# Patient Record
Sex: FEMALE | Employment: FULL TIME | URBAN - METROPOLITAN AREA
[De-identification: names, ages, dates, MRNs, and addresses within clinical notes are randomized per-mention and may not be internally consistent; named-entity substitution may affect disease eponyms.]

---

## 2020-05-22 ENCOUNTER — HOSPITAL ENCOUNTER (EMERGENCY)
Facility: CLINIC | Age: 30
Discharge: HOME | End: 2020-05-22
Attending: EMERGENCY MEDICINE | Admitting: EMERGENCY MEDICINE
Payer: COMMERCIAL

## 2020-05-22 VITALS
DIASTOLIC BLOOD PRESSURE: 72 MMHG | RESPIRATION RATE: 18 BRPM | HEART RATE: 78 BPM | OXYGEN SATURATION: 100 % | TEMPERATURE: 98 F | SYSTOLIC BLOOD PRESSURE: 124 MMHG

## 2020-05-22 DIAGNOSIS — H11.421 CHEMOSIS OF CONJUNCTIVA SUBCONJUNCTIVAL EDEMA, RIGHT: ICD-10-CM

## 2020-05-22 DIAGNOSIS — H10.13 ALLERGIC CONJUNCTIVITIS, BILATERAL: ICD-10-CM

## 2020-05-22 PROCEDURE — 25000132 ZZH RX MED GY IP 250 OP 250 PS 637: Performed by: EMERGENCY MEDICINE

## 2020-05-22 PROCEDURE — 99283 EMERGENCY DEPT VISIT LOW MDM: CPT

## 2020-05-22 RX ORDER — DIPHENHYDRAMINE HCL 25 MG
25 CAPSULE ORAL ONCE
Status: COMPLETED | OUTPATIENT
Start: 2020-05-22 | End: 2020-05-22

## 2020-05-22 RX ORDER — DIPHENHYDRAMINE HCL 25 MG
25-50 TABLET ORAL EVERY 6 HOURS PRN
Qty: 20 TABLET | Refills: 0 | Status: SHIPPED | OUTPATIENT
Start: 2020-05-22

## 2020-05-22 RX ADMIN — DIPHENHYDRAMINE HYDROCHLORIDE 25 MG: 25 CAPSULE ORAL at 08:35

## 2020-05-22 ASSESSMENT — ENCOUNTER SYMPTOMS
EYE DISCHARGE: 1
EYE ITCHING: 1
FACIAL SWELLING: 1

## 2020-05-22 NOTE — ED PROVIDER NOTES
History     Chief Complaint:  Facial Swelling    The history is provided by the patient.      Reji Lockett is a 30 year old female with a history of seasonal allergies who is inconsistently taking Allegra who presents with facial swelling. The patient reports last night she noticed some right eye swelling, watering and itchiness. She states that she spent quite a bit of time rubbing her eye yesterday. She also notes that she went on 2 walks yesterday. The patient states that when she woke up today she had increased swelling. The patient denies any history of eye swelling. The patient denies any pain or vision changes. She notes that she has a wedding in 10 days and is concerned about the swelling being present at the wedding.     Allergies:  No Known Drug Allergies     Medications:    The patient is not currently taking any prescribed medications.     Past Medical History:    History reviewed. No pertinent past medical history.     Past Surgical History:    History reviewed. No pertinent past surgical history.     Family History:    History reviewed. No pertinent family history.       Social History:  Smoking Status: Never Smoker  Smokeless Tobacco: Never Used  Alcohol Use: Positive     Review of Systems   HENT: Positive for facial swelling.    Eyes: Positive for discharge and itching. Negative for visual disturbance.   All other systems reviewed and are negative.      Physical Exam     Patient Vitals for the past 24 hrs:   BP Temp Temp src Heart Rate Resp SpO2   05/22/20 0752 (!) 135/97 98  F (36.7  C) Temporal 79 18 98 %       Physical Exam  Nursing note and vitals reviewed.  General: Patient is alert and interactive when I enter the room  Head:  The scalp, face, and head appear normal  Eyes:  Periorbital edema R>L    Injected conjunctiva bilaterally with chemosis on the right.     No erythema/cellulitis.     No proptosis.    EOMI  ENT:    The nose is normal    Pinnae are normal  Neck:  Trachea  midline  CV:  Normal rate  Resp:  No respiratory distress   Musc:  Normal muscular tone    No major joint effusions    No asymmetric leg swelling    Good capillary refill noted  Skin:  No rash or lesions noted  Neuro: Speech is normal and fluent. Face is symmetric. Moving all extremities well.   Psych:  Awake. Alert.  Normal affect.  Appropriate interactions.            Emergency Department Course     Interventions:  0835 Benadryl 25 mg oral     Emergency Department Course:     Nursing notes and vitals reviewed.    0800 I performed an exam of the patient as documented above.     0835 I answered all questions prior to discharge.     Impression & Plan      Medical Decision Making:  Reji Lockett is a 30 year old female who presents to the emergency department today for evaluation of right eye swelling.  She does not have any eye pain or vision changes.  Her presentation is consistent with allergic conjunctivitis, though she also has some chemosis of the right eye.  Symptoms are bilateral and associated with itching.  There is no pain or skin redness to suspect corneal abrasion or orbital/periorbital cellulitis.  Doubt infectious conjunctivitis given classic presentation of itching, watery eyes, and patient does report a history of seasonal allergies and takes antihistamine on a near daily basis.  She has a wedding in 10 days, therefore I recommended that she start taking Benadryl, as well as Visine eyedrops until after the wedding to prevent any rebound hyperemia on her wedding day.  Also discussed that she can see ophthalmology on Monday/Tuesday if symptoms are not improving over the weekend.  Return to emergency department for any eye pain, changes in vision, or for any other concerns.  She is discharged in stable condition.  All questions answered and she is in agreement with the plan.    Diagnosis:    ICD-10-CM    1. Allergic conjunctivitis, bilateral  H10.13    2. Chemosis of conjunctiva subconjunctival edema,  right  H11.421      Disposition:   Findings and plan explained to the Patient. Patient discharged home with instructions regarding supportive care, medications, and reasons to return. The importance of close follow-up was reviewed. The patient was prescribed benadryl and naphcon-A.     Discharge Medications:  START taking      Dose / Directions   diphenhydrAMINE 25 MG tablet  Commonly known as:  BENADRYL      Dose:  25-50 mg  Take 1-2 tablets (25-50 mg) by mouth every 6 hours as needed for itching or allergies  Quantity:  20 tablet  Refills:  0     naphazoline-pheniramine 0.025-0.3 % ophthalmic solution  Commonly known as:  NAPHCON-A      Dose:  1-2 drop  Place 1-2 drops into both eyes 4 times daily as needed for allergies or dry eyes  Quantity:  5 mL  Refills:  0       Scribe Disclosure:  Monique LEDESMA, am serving as a scribe at 7:59 AM on 5/22/2020 to document services personally performed by Goyo Weir MD based on my observations and the provider's statements to me.    [facility] EMERGENCY DEPARTMENT       Goyo Weir MD  05/22/20 0858

## 2020-05-22 NOTE — DISCHARGE INSTRUCTIONS
Diagnosis: Allergic conjunctivitis with chemosis  Plan: Start benadryl every 6 hours for rest of today into tomorrow.  Start visine eye drops - use these for a maximum of 2 weeks.   When you stop using these, it's common for your eyes to be a little more red the first 2-3 days after you've stopped. Since your wedding is in the next 2 weeks, it might make more sense to stop after your wedding day.  Follow-up with ophthalmology early next week if your symptoms haven't improved over the weekend.   Return Precautions:     Please read the remainder of your discharge instructions for more information.

## 2020-05-22 NOTE — ED AVS SNAPSHOT
[facility] Emergency Department  [address]  [place] [address]  Phone:  [phone]  Fax:  [phone]                                    Reji Lockett   MRN: [id number]    Department:  [facility] Emergency Department   Date of Visit:  5/22/2020           After Visit Summary Signature Page    I have received my discharge instructions, and my questions have been answered. I have discussed any challenges I see with this plan with the nurse or doctor.    ..........................................................................................................................................  Patient/Patient Representative Signature      ..........................................................................................................................................  Patient Representative Print Name and Relationship to Patient    ..................................................               ................................................  Date                                   Time    ..........................................................................................................................................  Reviewed by Signature/Title    ...................................................              ..............................................  Date                                               Time          22EPIC Rev 08/18

## 2020-06-01 ENCOUNTER — HOSPITAL ENCOUNTER (EMERGENCY)
Facility: CLINIC | Age: 30
Discharge: HOME | End: 2020-06-02
Attending: EMERGENCY MEDICINE | Admitting: EMERGENCY MEDICINE
Payer: COMMERCIAL

## 2020-06-01 VITALS
SYSTOLIC BLOOD PRESSURE: 126 MMHG | RESPIRATION RATE: 20 BRPM | TEMPERATURE: 98.3 F | OXYGEN SATURATION: 100 % | HEART RATE: 89 BPM | DIASTOLIC BLOOD PRESSURE: 87 MMHG

## 2020-06-01 DIAGNOSIS — R21 RASH DUE TO ALLERGY: ICD-10-CM

## 2020-06-01 DIAGNOSIS — T78.40XA RASH DUE TO ALLERGY: ICD-10-CM

## 2020-06-01 PROCEDURE — 99283 EMERGENCY DEPT VISIT LOW MDM: CPT

## 2020-06-01 NOTE — ED AVS SNAPSHOT
[facility] Emergency Department  [address]  [facility] [address]  Phone:  [phone]  Fax:  [phone]                                    Reji Lockett   MRN: [id number]    Department:  [facility] Emergency Department   Date of Visit:  6/1/2020           After Visit Summary Signature Page    I have received my discharge instructions, and my questions have been answered. I have discussed any challenges I see with this plan with the nurse or doctor.    ..........................................................................................................................................  Patient/Patient Representative Signature      ..........................................................................................................................................  Patient Representative Print Name and Relationship to Patient    ..................................................               ................................................  Date                                   Time    ..........................................................................................................................................  Reviewed by Signature/Title    ...................................................              ..............................................  Date                                               Time          22EPIC Rev 08/18

## 2020-06-02 PROCEDURE — 25000131 ZZH RX MED GY IP 250 OP 636 PS 637: Performed by: EMERGENCY MEDICINE

## 2020-06-02 RX ORDER — PREDNISONE 20 MG/1
40 TABLET ORAL ONCE
Status: COMPLETED | OUTPATIENT
Start: 2020-06-02 | End: 2020-06-02

## 2020-06-02 RX ORDER — TRIAMCINOLONE ACETONIDE 1 MG/G
CREAM TOPICAL
Qty: 45 G | Refills: 0 | Status: SHIPPED | OUTPATIENT
Start: 2020-06-02 | End: 2020-06-16

## 2020-06-02 RX ORDER — PREDNISONE 20 MG/1
40 TABLET ORAL DAILY
Qty: 8 TABLET | Refills: 0 | Status: SHIPPED | OUTPATIENT
Start: 2020-06-02

## 2020-06-02 RX ORDER — PREDNISONE 20 MG/1
40 TABLET ORAL DAILY
Qty: 8 TABLET | Refills: 0 | Status: SHIPPED | OUTPATIENT
Start: 2020-06-02 | End: 2020-06-02

## 2020-06-02 RX ADMIN — PREDNISONE 40 MG: 20 TABLET ORAL at 01:00

## 2020-06-02 ASSESSMENT — ENCOUNTER SYMPTOMS: FEVER: 0

## 2020-06-02 NOTE — ED PROVIDER NOTES
History     Chief Complaint:  Derm Problem      HPI   Reji Lockett is a 30 year old female who presents for the evaluation of derm problem. The patient reports that last night she started to develop blisters and an itchiness sensation across her hands, forearms, and lower legs where she had lencho tattooing done that has remained persistent, prompting her to the ED. The patient describes that the blisters will burn with water washing. She states that she had lencho tattoos done in 2018 with a similar reaction, but notes that it was much less severe. She notes that she also is experiencing increased sneezing and rhinorrhea and that she took a dose of Allegra today. The patient denies fever and other issues.      Allergies:  No known drug allergies      Medications:    The patient is not currently taking any prescribed medications.     Past Medical History:    Asthma  Irregular menstrual cycle  Obesity  Constipation  Malaise and fatigue   Elevated hemoglobin A1c    Past Surgical History:    History reviewed. No pertinent surgical history.     Family History:    History reviewed. No pertinent family history.      Social History:  Smoking status: Never smoker  Alcohol use: Yes  Drug use: No  PCP: No Ref-Primary, Physician   Marital Status:  Single [1]     Review of Systems   Constitutional: Negative for fever.   Skin: Positive for rash.   All other systems reviewed and are negative.        Physical Exam     Patient Vitals for the past 24 hrs:   BP Temp Temp src Pulse Heart Rate Resp SpO2   06/01/20 2302 126/87 98.3  F (36.8  C) Oral 89 89 20 100 %      Physical Exam  I have reviewed the triage vital signs    Constitutional: Appears stated age  Eyes: No discharge, symmetrical lids  ENT: Moist mucous membranes, no ear discharge. No angioedema, voice normal  Neck: Full range of motion  Respiratory: CTAB, no wheezes  Cardiovascular: Regular rate and rhythm, no cyanosis  Gastrointestinal: Nondistended,  nonscaphoid  Musculoskeletal: No gross deformities.   Skin: Warm and well perfused. Elham tattoos to bilateral forearms and lower legs.  Raised papular rash to elham surfaces with multiple vesicles to punctate regions of elham tattoo, nondraining, nontender, pruritic  Neurologic: Moves all extremities, speech fluent without dysarthria  Psychiatric: Appropriate affect, alert and interactive    Emergency Department Course   Interventions:  Medications   predniSONE (DELTASONE) tablet 40 mg (has no administration in time range)         Emergency Department Course:  Past medical records, nursing notes, and vitals reviewed.  0033: I performed an exam of the patient and obtained history, as documented above.     Findings and plan explained to the Patient. Patient discharged home with instructions regarding supportive care, medications, and reasons to return. The importance of close follow-up was reviewed. The patient was prescribed prednisone and triamcinolone.       Impression & Plan    Medical Decision MakinF presenting with rash.  DDx includes but is not limited to allergic rash, anaphylactoid reaction, doubt anaphylaxis, doubt cellulitis, doubt abscess  Suspect allergic reaction, given prior allergy to elham and appearance of rash and pruritic nature.  Does not appear acutely infected.  Pt otherwise very well appearing.  No e/o anaphylaxis, single system involvement.  Will treat with steroids as above.  Advised follow-up with PCP for referral to allergist.  RTED precautions given.    Diagnosis:    ICD-10-CM    1. Rash due to allergy  R21        Disposition:  Discharged to home.    Discharge Medications:  New Prescriptions    PREDNISONE (DELTASONE) 20 MG TABLET    Take 2 tablets (40 mg) by mouth daily for 4 days    TRIAMCINOLONE (KENALOG) 0.1 % EXTERNAL CREAM    80 grams     Scribe Disclosure:  Suresh LEDESMA, marisol serving as a scribe at 12:08 AM on 2020 to document services personally performed by Scott  Leo Alfonso MD based on my observations and the provider's statements to me.      Suresh Luciano  6/1/2020   [facility] EMERGENCY DEPARTMENT       Vo, Leo Alfonso MD  06/02/20 0058

## 2020-06-02 NOTE — ED TRIAGE NOTES
Pt arrives with complaints of blistering and itchiness after getting lencho tattoos on bilateral arms, legs, hands, feet. Pt noticed these starting last night, says she took benadryl last night. Had a reaction to lencho in the past. ABCs intact, A/O x4.

## 2022-11-18 RX ORDER — FEXOFENADINE HCL 180 MG/1
180 TABLET ORAL DAILY
COMMUNITY

## 2022-11-18 RX ORDER — MULTIVIT-MIN/IRON/FOLIC ACID/K 18-600-40
CAPSULE ORAL
COMMUNITY

## 2022-11-18 RX ORDER — ORAL SEMAGLUTIDE 3 MG/1
TABLET ORAL
COMMUNITY

## 2022-11-22 ENCOUNTER — ANESTHESIA EVENT (OUTPATIENT)
Dept: SURGERY | Facility: AMBULATORY SURGERY CENTER | Age: 32
End: 2022-11-22
Payer: COMMERCIAL

## 2022-11-23 ENCOUNTER — ANESTHESIA (OUTPATIENT)
Dept: SURGERY | Facility: AMBULATORY SURGERY CENTER | Age: 32
End: 2022-11-23
Payer: COMMERCIAL

## 2022-11-23 ENCOUNTER — HOSPITAL ENCOUNTER (OUTPATIENT)
Facility: AMBULATORY SURGERY CENTER | Age: 32
Discharge: HOME | End: 2022-11-23
Attending: STUDENT IN AN ORGANIZED HEALTH CARE EDUCATION/TRAINING PROGRAM
Payer: COMMERCIAL

## 2022-11-23 VITALS
SYSTOLIC BLOOD PRESSURE: 131 MMHG | RESPIRATION RATE: 16 BRPM | DIASTOLIC BLOOD PRESSURE: 97 MMHG | BODY MASS INDEX: 43.23 KG/M2 | HEIGHT: 66 IN | HEART RATE: 96 BPM | WEIGHT: 269 LBS | OXYGEN SATURATION: 96 % | TEMPERATURE: 97.3 F

## 2022-11-23 DIAGNOSIS — N93.9 ABNORMAL UTERINE BLEEDING (AUB): ICD-10-CM

## 2022-11-23 DIAGNOSIS — N84.0 ENDOMETRIAL POLYP: ICD-10-CM

## 2022-11-23 LAB
HCG UR QL: NEGATIVE
INTERNAL QC OK POCT: NORMAL
POCT KIT EXPIRATION DATE: NORMAL
POCT KIT LOT NUMBER: NORMAL

## 2022-11-23 DEVICE — IMPLANTABLE DEVICE: Type: IMPLANTABLE DEVICE | Site: UTERUS | Status: FUNCTIONAL

## 2022-11-23 RX ORDER — MEPERIDINE HYDROCHLORIDE 25 MG/ML
12.5 INJECTION INTRAMUSCULAR; INTRAVENOUS; SUBCUTANEOUS
Status: DISCONTINUED | OUTPATIENT
Start: 2022-11-23 | End: 2022-11-24 | Stop reason: HOSPADM

## 2022-11-23 RX ORDER — GLYCOPYRROLATE 0.2 MG/ML
INJECTION, SOLUTION INTRAMUSCULAR; INTRAVENOUS PRN
Status: DISCONTINUED | OUTPATIENT
Start: 2022-11-23 | End: 2022-11-23

## 2022-11-23 RX ORDER — HYDROMORPHONE HCL IN WATER/PF 6 MG/30 ML
0.2 PATIENT CONTROLLED ANALGESIA SYRINGE INTRAVENOUS EVERY 5 MIN PRN
Status: DISCONTINUED | OUTPATIENT
Start: 2022-11-23 | End: 2022-11-24 | Stop reason: HOSPADM

## 2022-11-23 RX ORDER — HYDROMORPHONE HCL IN WATER/PF 6 MG/30 ML
0.4 PATIENT CONTROLLED ANALGESIA SYRINGE INTRAVENOUS EVERY 5 MIN PRN
Status: DISCONTINUED | OUTPATIENT
Start: 2022-11-23 | End: 2022-11-24 | Stop reason: HOSPADM

## 2022-11-23 RX ORDER — ONDANSETRON 2 MG/ML
4 INJECTION INTRAMUSCULAR; INTRAVENOUS EVERY 30 MIN PRN
Status: DISCONTINUED | OUTPATIENT
Start: 2022-11-23 | End: 2022-11-24 | Stop reason: HOSPADM

## 2022-11-23 RX ORDER — FENTANYL CITRATE 0.05 MG/ML
50 INJECTION, SOLUTION INTRAMUSCULAR; INTRAVENOUS EVERY 5 MIN PRN
Status: DISCONTINUED | OUTPATIENT
Start: 2022-11-23 | End: 2022-11-24 | Stop reason: HOSPADM

## 2022-11-23 RX ORDER — KETAMINE HYDROCHLORIDE 10 MG/ML
INJECTION INTRAMUSCULAR; INTRAVENOUS PRN
Status: DISCONTINUED | OUTPATIENT
Start: 2022-11-23 | End: 2022-11-23

## 2022-11-23 RX ORDER — IBUPROFEN 800 MG/1
800 TABLET, FILM COATED ORAL ONCE
Status: DISCONTINUED | OUTPATIENT
Start: 2022-11-23 | End: 2022-11-24 | Stop reason: HOSPADM

## 2022-11-23 RX ORDER — LIDOCAINE 40 MG/G
CREAM TOPICAL
Status: DISCONTINUED | OUTPATIENT
Start: 2022-11-23 | End: 2022-11-24 | Stop reason: HOSPADM

## 2022-11-23 RX ORDER — ACETAMINOPHEN 325 MG/1
975 TABLET ORAL EVERY 6 HOURS PRN
Qty: 50 TABLET | Refills: 0 | Status: SHIPPED | OUTPATIENT
Start: 2022-11-23

## 2022-11-23 RX ORDER — ONDANSETRON 4 MG/1
4 TABLET, ORALLY DISINTEGRATING ORAL EVERY 30 MIN PRN
Status: DISCONTINUED | OUTPATIENT
Start: 2022-11-23 | End: 2022-11-24 | Stop reason: HOSPADM

## 2022-11-23 RX ORDER — ACETAMINOPHEN 325 MG/1
975 TABLET ORAL ONCE
Status: DISCONTINUED | OUTPATIENT
Start: 2022-11-23 | End: 2022-11-24 | Stop reason: HOSPADM

## 2022-11-23 RX ORDER — IBUPROFEN 800 MG/1
800 TABLET, FILM COATED ORAL EVERY 6 HOURS PRN
Qty: 30 TABLET | Refills: 0 | Status: SHIPPED | OUTPATIENT
Start: 2022-11-23

## 2022-11-23 RX ORDER — LIDOCAINE HYDROCHLORIDE 20 MG/ML
INJECTION, SOLUTION INFILTRATION; PERINEURAL PRN
Status: DISCONTINUED | OUTPATIENT
Start: 2022-11-23 | End: 2022-11-23

## 2022-11-23 RX ORDER — FENTANYL CITRATE 50 UG/ML
INJECTION, SOLUTION INTRAMUSCULAR; INTRAVENOUS PRN
Status: DISCONTINUED | OUTPATIENT
Start: 2022-11-23 | End: 2022-11-23

## 2022-11-23 RX ORDER — ONDANSETRON 2 MG/ML
INJECTION INTRAMUSCULAR; INTRAVENOUS PRN
Status: DISCONTINUED | OUTPATIENT
Start: 2022-11-23 | End: 2022-11-23

## 2022-11-23 RX ORDER — SODIUM CHLORIDE, SODIUM LACTATE, POTASSIUM CHLORIDE, CALCIUM CHLORIDE 600; 310; 30; 20 MG/100ML; MG/100ML; MG/100ML; MG/100ML
INJECTION, SOLUTION INTRAVENOUS CONTINUOUS
Status: DISCONTINUED | OUTPATIENT
Start: 2022-11-23 | End: 2022-11-24 | Stop reason: HOSPADM

## 2022-11-23 RX ORDER — OXYCODONE HYDROCHLORIDE 5 MG/1
5 TABLET ORAL
Status: DISCONTINUED | OUTPATIENT
Start: 2022-11-23 | End: 2022-11-24 | Stop reason: HOSPADM

## 2022-11-23 RX ORDER — DEXAMETHASONE SODIUM PHOSPHATE 4 MG/ML
INJECTION, SOLUTION INTRA-ARTICULAR; INTRALESIONAL; INTRAMUSCULAR; INTRAVENOUS; SOFT TISSUE PRN
Status: DISCONTINUED | OUTPATIENT
Start: 2022-11-23 | End: 2022-11-23

## 2022-11-23 RX ORDER — FENTANYL CITRATE 0.05 MG/ML
25 INJECTION, SOLUTION INTRAMUSCULAR; INTRAVENOUS EVERY 5 MIN PRN
Status: DISCONTINUED | OUTPATIENT
Start: 2022-11-23 | End: 2022-11-24 | Stop reason: HOSPADM

## 2022-11-23 RX ORDER — ACETAMINOPHEN 325 MG/1
975 TABLET ORAL ONCE
Status: COMPLETED | OUTPATIENT
Start: 2022-11-23 | End: 2022-11-23

## 2022-11-23 RX ORDER — PROPOFOL 10 MG/ML
INJECTION, EMULSION INTRAVENOUS CONTINUOUS PRN
Status: DISCONTINUED | OUTPATIENT
Start: 2022-11-23 | End: 2022-11-23

## 2022-11-23 RX ORDER — KETOROLAC TROMETHAMINE 30 MG/ML
INJECTION, SOLUTION INTRAMUSCULAR; INTRAVENOUS PRN
Status: DISCONTINUED | OUTPATIENT
Start: 2022-11-23 | End: 2022-11-23

## 2022-11-23 RX ORDER — FENTANYL CITRATE 0.05 MG/ML
25 INJECTION, SOLUTION INTRAMUSCULAR; INTRAVENOUS
Status: DISCONTINUED | OUTPATIENT
Start: 2022-11-23 | End: 2022-11-24 | Stop reason: HOSPADM

## 2022-11-23 RX ADMIN — KETOROLAC TROMETHAMINE 15 MG: 30 INJECTION, SOLUTION INTRAMUSCULAR; INTRAVENOUS at 13:13

## 2022-11-23 RX ADMIN — PROPOFOL 250 MCG/KG/MIN: 10 INJECTION, EMULSION INTRAVENOUS at 12:47

## 2022-11-23 RX ADMIN — FENTANYL CITRATE 25 MCG: 50 INJECTION, SOLUTION INTRAMUSCULAR; INTRAVENOUS at 12:51

## 2022-11-23 RX ADMIN — DEXAMETHASONE SODIUM PHOSPHATE 4 MG: 4 INJECTION, SOLUTION INTRA-ARTICULAR; INTRALESIONAL; INTRAMUSCULAR; INTRAVENOUS; SOFT TISSUE at 12:51

## 2022-11-23 RX ADMIN — FENTANYL CITRATE 25 MCG: 50 INJECTION, SOLUTION INTRAMUSCULAR; INTRAVENOUS at 13:13

## 2022-11-23 RX ADMIN — KETAMINE HYDROCHLORIDE 10 MG: 10 INJECTION INTRAMUSCULAR; INTRAVENOUS at 12:47

## 2022-11-23 RX ADMIN — ACETAMINOPHEN 975 MG: 325 TABLET ORAL at 12:01

## 2022-11-23 RX ADMIN — GLYCOPYRROLATE 0.2 MG: 0.2 INJECTION, SOLUTION INTRAMUSCULAR; INTRAVENOUS at 12:45

## 2022-11-23 RX ADMIN — ONDANSETRON 4 MG: 2 INJECTION INTRAMUSCULAR; INTRAVENOUS at 12:45

## 2022-11-23 RX ADMIN — FENTANYL CITRATE 25 MCG: 50 INJECTION, SOLUTION INTRAMUSCULAR; INTRAVENOUS at 13:18

## 2022-11-23 RX ADMIN — SODIUM CHLORIDE, SODIUM LACTATE, POTASSIUM CHLORIDE, CALCIUM CHLORIDE: 600; 310; 30; 20 INJECTION, SOLUTION INTRAVENOUS at 12:25

## 2022-11-23 RX ADMIN — LIDOCAINE HYDROCHLORIDE 30 MG: 20 INJECTION, SOLUTION INFILTRATION; PERINEURAL at 12:45

## 2022-11-23 RX ADMIN — FENTANYL CITRATE 25 MCG: 50 INJECTION, SOLUTION INTRAMUSCULAR; INTRAVENOUS at 13:07

## 2022-11-23 RX ADMIN — KETAMINE HYDROCHLORIDE 10 MG: 10 INJECTION INTRAMUSCULAR; INTRAVENOUS at 12:51

## 2022-11-23 NOTE — ANESTHESIA CARE TRANSFER NOTE
Patient: Reji Lockett    Procedure: Procedure(s):  HYSTEROSCOPY, POLYPECTOMY,  DILATION AND CURETTAGE, LILETTA INTRAUTERINE DEVICE INSERTION       Diagnosis: Abnormal uterine bleeding (AUB) [N93.9]  Endometrial polyp [N84.0]  Diagnosis Additional Information: No value filed.    Anesthesia Type:   MAC     Note:    Oropharynx: oropharynx clear of all foreign objects  Level of Consciousness: drowsy  Oxygen Supplementation: face mask  Level of Supplemental Oxygen (L/min / FiO2): 8  Independent Airway: airway patency satisfactory and stable  Dentition: dentition unchanged  Vital Signs Stable: post-procedure vital signs reviewed and stable  Report to RN Given: handoff report given  Patient transferred to: Phase II    Handoff Report: Identifed the Patient, Identified the Reponsible Provider, Reviewed the pertinent medical history, Discussed the surgical course, Reviewed Intra-OP anesthesia mangement and issues during anesthesia, Set expectations for post-procedure period and Allowed opportunity for questions and acknowledgement of understanding      Vitals:  Vitals Value Taken Time   /59 11/23/22 1320   Temp 97.3  F (36.3  C) 11/23/22 1320   Pulse 105 11/23/22 1321   Resp 16 11/23/22 1320   SpO2 95 % 11/23/22 1321   Vitals shown include unvalidated device data.    Electronically Signed By: RIGO Alfaro CRNA  November 23, 2022  1:23 PM

## 2022-11-23 NOTE — OP NOTE
Operative Report    Patient: Reji Lockett    MRN: [id number]    CSN: [id number]    11/23/2022    Procedure date: 11/23/2022    Preoperative Diagnosis:    1. 33yo with AUB and suspected intrauterine polyp     Postoperative Diagnosis: Same    Procedure(s): Procedure(s):  HYSTEROSCOPY, POLYPECTOMY,  DILATION AND CURETTAGE, LILETTA INTRAUTERINE DEVICE INSERTION    Attending Surgeon: Kath Le MD    Assistant(s): Circulator: Herminia Mckeon RN    Anesthesia: MAC    EBL: 10mL    Fluids: see anesthesia record     UOP: N/A     Fluid deficit: minimal     Description of findings:  EUA revealed normal size anteverted uterus, no adnexal masses.  Hysteroscopy demonstrated normal ostia bilaterally, normal appearing endometrium with 5mm left lateral polyp.     Specimens submitted:  ID Type Source Tests Collected by Time Destination   1 : Endometrium polyp and currettings Curettings Endometrium SURGICAL PATHOLOGY EXAM Kath Le MD 11/23/2022  1:07 PM        Disposition: Stable to PACU     Complications: none    Description of Operation:    The patient was explained the procedure. All risks, benefits, alternatives were discussed. The consent form was signed with a witness present.    The patient was taken to the OR where SCDs were placed and turned on. General anesthesia was induced without difficulty. The patient was then placed in dorsal lithotomy position using Han stirrups. She was examined for the above noted findings. Then, the patient was prepped and draped in usual sterile fashion.    The bladder was drained with a straight catheter. A bivalve speculum was used to identify the cervix. Singled toothed tenaculum was placed on the anterior lip of the cervix. A cervical block was performed with 10cc lidocaine. The cervix was dilated to accomodate an operative hysteroscope sequentially using [place] dilators. An operative hysteroscope was carefully introduced through the cervix to the uterus under direct  visualization. Sterile saline was used a medium for visualization of the uterine cavity. Both ostia were visualized and appeared normal. A 5mm polyp was noted on the left lateral wall, and was resected in fragments using the Myosure hysteroscopic morcellator.  No bleeding was noted from the site of the fibroid/polyp or other areas of the uterine cavity. The scope was then removed from the uterus and gentle curettage was performed. Next the levonorgestrel Liletta IUD was placed to the fundus using manufacturor's applicator, and string trimmed to 2cm. Next the tenaculum was removed from the cervix and the puncture sites were noted to be hemostatic. All instruments were then removed from the vagina.    Ins and outs were noted. All needle, instrument, and lap sponge count correct x 2. The procedure was overall without complication. The patient was repositioned to dorsal supine position, extubated without event and taken to the recovery room in stable condition.    Kath Le MD

## 2022-11-23 NOTE — DISCHARGE INSTRUCTIONS
If you have any questions or concerns regarding your procedure, please contact Dr. Le, her office number is [phone].    You received 975 mg of acetaminophen (Tylenol) at 12:01 PM. Please do not take an additional dose of Tylenol until after 6:01 PM.    Do not exceed 4,000 mg of acetaminophen in a 24 hour period, keep in mind that acetaminophen can also be found in many over-the-counter cold medications as well as narcotics that may be given for pain.    You received a medication called Toradol (a stronger IV ibuprofen) at 1:13 PM. Do NOT take any Ibuprofen / Advil / Motrin / Aleve / Naproxen or products containing Ibuprofen until 7:13 PM or later.    Hysteroscopy    Hysteroscopy is a procedure done to see inside your uterus. It can help find the cause of problems in the uterus. This helps your healthcare provider decide on the best treatment. In some cases, it can be used to perform treatment. Hysteroscopy may be done in your healthcare provider's office, outpatient surgery center, or in the hospital.     Why might I need hysteroscopy?    Hysteroscopy may be done based on the results of other tests. It can help find the cause of problems. These can include:   Unusually heavy or long menstrual periods  Bleeding between periods  Postmenopausal bleeding  Trouble becoming pregnant (infertility) or carrying a pregnancy to term  To locate an IUD (intrauterine device)    What happens after hysteroscopy?  You may have cramps and bleeding for short time after the procedure. This is normal. Use pads instead of tampons.  Don't douche or use tampons until your healthcare provider says it s OK.  Don't use any vaginal medicines until you are told it s OK.  Ask your healthcare provider when it s OK to have sex again.    When to call your healthcare provider    Call your healthcare provider if you have:   Heavy bleeding (more than 1 pad an hour for 2 or more hours)  A fever of 101.5 F (38.6 C) or higher  Increasing  belly (abdominal) pain or soreness  Bad-smelling discharge    Coping with pain    *Pain after surgery is normal and expected*    If you have pain after surgery, pain medicine will help you feel better. Take it as told, before pain becomes severe. Also, ask your healthcare provider or pharmacist about other ways to control pain. This might be with heat, ice, or relaxation. And follow any other instructions your surgeon or nurse gives you.    Tips for taking pain medicine    To get the best relief possible, remember these points:    Pain medicines can upset your stomach. Taking them with a little food may help.  Most pain relievers taken by mouth need at least 20 to 30 minutes to start to work.  Don't wait till your pain becomes severe before you take your medicine. Try to time your medicine so that you can take it before starting an activity. This might be before you get dressed, go for a walk, or sit down for dinner.  Constipation is a common side effect of pain medicines. You can take medicines such as laxatives (Miralax) or stool softeners to help ease constipation. Drinking lots of fluids and eating foods such as fruits and vegetables that are high in fiber can also help.  Drinking alcohol and taking pain medicine can cause dizziness and slow your breathing. It can even be deadly. Don't drink alcohol while taking pain medicine.  Pain medicine can make you react more slowly to things. Don't drive or run machinery while taking pain medicine.  Your healthcare provider may tell you to take acetaminophen to help ease your pain. Ask him or her how much you are supposed to take each day. Acetaminophen or other pain relievers may interact with your prescription medicines or other over-the-counter (OTC) medicines. Some prescription medicines have acetaminophen and other ingredients. Using both prescription and OTC acetaminophen for pain can cause you to overdose. Read the labels on your OTC medicines with care. This will  help you to clearly know the list of ingredients, how much to take, and any warnings. It may also help you not take too much acetaminophen. If you have questions or don't understand the information, ask your pharmacist or healthcare provider to explain it to you before you take the OTC medicine.    Discharge Instructions: After Your Surgery, regarding Anesthesia    You ve just had surgery. During surgery, you were given medicine called anesthesia to keep you relaxed and free of pain. After surgery, you may have some pain or nausea. This is common. Here are some tips for feeling better and getting well after surgery.    Going home    Your healthcare provider will show you how to take care of yourself when you go home. He or she will also answer your questions. Have an adult family member or friend drive you home. For the first 24 hours after your surgery:    Don't drive or use heavy equipment.  Don't make important decisions or sign legal papers.  Don't drink alcohol.  Have an adult stay with you for the next 24 hours. He or she can watch for problems and help keep you safe.  Be sure to go to all follow-up visits with your healthcare provider. And rest after your surgery for as long as your healthcare provider tells you to.    Managing nausea    Some people have an upset stomach after surgery. This is often because of anesthesia, pain, or pain medicine, or the stress of surgery. These tips will help you handle nausea and eat healthy foods as you get better. If you were on a special food plan before surgery, ask your healthcare provider if you should follow it while you get better. These tips may help:    Don't push yourself to eat. Your body will tell you when to eat and how much.  Start off with clear liquids and soup. They are easier to digest.  Next try semi-solid foods, such as mashed potatoes, applesauce, and gelatin, as you feel ready.  Slowly move to solid foods. Don t eat fatty, rich, or spicy foods at  first.  Don't force yourself to have 3 large meals a day. Instead eat smaller amounts more often.  Take pain medicines with a small amount of solid food, such as crackers or toast, to prevent nausea.    If you have obstructive sleep apnea    You were given anesthesia medicine during surgery to keep you comfortable and free of pain. After surgery, you may have more apnea spells because of this medicine and other medicines you were given. The spells may last longer than usual.   At home:    Keep using the continuous positive airway pressure (CPAP) device when you sleep. Unless your healthcare provider tells you not to, use it when you sleep, day or night. CPAP is a common device used to treat obstructive sleep apnea.  Talk with your provider before taking any pain medicine, muscle relaxants, or sedatives. Your provider will tell you about the possible dangers of taking these medicines.

## 2022-11-23 NOTE — ANESTHESIA PREPROCEDURE EVALUATION
Anesthesia Pre-Procedure Evaluation    Patient: Reji Lockett   MRN: [id number] : 1990        Procedure : Procedure(s):  HYSTEROSCOPY, POLYPECTOMY,  DILATION AND CURETTAGE, LILETTA INTRAUTERINE DEVICE INSERTION          Past Medical History:   Diagnosis Date     Uncomplicated asthma       Past Surgical History:   Procedure Laterality Date     LIPOSUCTION TRUNK      Lower Back and Abdomen     WISDOM TOOTH EXTRACTION        No Known Allergies   Social History     Tobacco Use     Smoking status: Never     Smokeless tobacco: Never   Substance Use Topics     Alcohol use: Not Currently      Wt Readings from Last 1 Encounters:   22 122 kg (269 lb)        Anesthesia Evaluation            ROS/MED HX  ENT/Pulmonary:     (+) Intermittent, asthma     Neurologic:  - neg neurologic ROS     Cardiovascular:  - neg cardiovascular ROS     METS/Exercise Tolerance:     Hematologic:  - neg hematologic  ROS     Musculoskeletal:  - neg musculoskeletal ROS     GI/Hepatic:  - neg GI/hepatic ROS     Renal/Genitourinary:  - neg Renal ROS     Endo:  - neg endo ROS   (+) Obesity,     Psychiatric/Substance Use:  - neg psychiatric ROS     Infectious Disease:  - neg infectious disease ROS     Malignancy:  - neg malignancy ROS     Other:  - neg other ROS          Physical Exam    Airway  airway exam normal      Mallampati: II       Respiratory Devices and Support         Dental  no notable dental history         Cardiovascular   cardiovascular exam normal       Rhythm and rate: regular and normal     Pulmonary   pulmonary exam normal        breath sounds clear to auscultation           OUTSIDE LABS:  CBC: No results found for: WBC, HGB, HCT, PLT  BMP: No results found for: NA, POTASSIUM, CHLORIDE, CO2, BUN, CR, GLC  COAGS: No results found for: PTT, INR, FIBR  POC:   Lab Results   Component Value Date    HCG Negative 2022     HEPATIC: No results found for: ALBUMIN, PROTTOTAL, ALT, AST, GGT, ALKPHOS, BILITOTAL, BILIDIRECT,  APOLINAR  OTHER: No results found for: PH, LACT, A1C, MERARI, PHOS, MAG, LIPASE, AMYLASE, TSH, T4, T3, CRP, SED    Anesthesia Plan    ASA Status:  3      Anesthesia Type: MAC.   Induction: Intravenous, Propofol.   Maintenance: TIVA.        Consents    Anesthesia Plan(s) and associated risks, benefits, and realistic alternatives discussed. Questions answered and patient/representative(s) expressed understanding.    - Discussed:     - Discussed with:  Patient      - Extended Intubation/Ventilatory Support Discussed: No.      - Patient is DNR/DNI Status: No    Use of blood products discussed: No .     Postoperative Care    Pain management: IV analgesics, Oral pain medications.   PONV prophylaxis: Ondansetron (or other 5HT-3), Dexamethasone or Solumedrol     Comments:    Other Comments: The patient understands and accepts the risks of MAC anesthesia including (but not limited to) nausea, vomiting, dizziness, and chipped teeth. I also discussed the possibility of conversion to GAETT/GALMA anesthesia which include hoarse voice, sore throat, and pinched lip or chipped teeth.  tyeno po pre op  Toradol at he end of the case if okay with the surgeon   Versed/fent  propofol ggt  Decadron/zofran            Alex Celaya MD

## 2022-11-23 NOTE — ANESTHESIA POSTPROCEDURE EVALUATION
Patient: Reji Lockett    Procedure: Procedure(s):  HYSTEROSCOPY, POLYPECTOMY,  DILATION AND CURETTAGE, LILETTA INTRAUTERINE DEVICE INSERTION       Anesthesia Type:  MAC    Note:  Disposition: Outpatient   Postop Pain Control: Uneventful            Sign Out: Well controlled pain   PONV: No   Neuro/Psych: Uneventful            Sign Out: Acceptable/Baseline neuro status   Airway/Respiratory: Uneventful            Sign Out: Acceptable/Baseline resp. status   CV/Hemodynamics: Uneventful            Sign Out: Acceptable CV status; No obvious hypovolemia; No obvious fluid overload   Other NRE: NONE   DID A NON-ROUTINE EVENT OCCUR? No           Last vitals:  Vitals Value Taken Time   /97 11/23/22 1345   Temp 97.3  F (36.3  C) 11/23/22 1320   Pulse 96 11/23/22 1346   Resp 16 11/23/22 1345   SpO2 95 % 11/23/22 1346   Vitals shown include unvalidated device data.    Electronically Signed By: Alex Celaya MD  November 23, 2022  2:37 PM

## 2022-11-23 NOTE — H&P
"HISTORY AND PHYSICAL UPDATE ADMISSION EXAM    Name: Reji Lockett  YOB: 1990  Medical Record Number: [id number]    History of Present Illness: Reji Lockett is a 31yo with AUB and suspected intrauterine polyp presenting for scheduled hysteroscopy/polypectomy/levnorgestrel IUD insertion.     Past Medical History:   Diagnosis Date     Uncomplicated asthma      Past Surgical History:   Procedure Laterality Date     LIPOSUCTION TRUNK      Lower Back and Abdomen     WISDOM TOOTH EXTRACTION         Exam:      /77   Temp 97.5  F (36.4  C) (Temporal)   Resp 16   Ht 1.676 m (5' 6\")   Wt 122 kg (269 lb)   SpO2 98%   BMI 43.42 kg/m    HEENT grossly normal  Neck: no lymphadenopathy or thryoidomegaly  Lungs CTA b/l  Heart RRR  ABD gravid, non-tender  EXT:  no edema, moves freely     TVUS 4.30 x 2.91 x 6.70cm. EE 8.71mm. One 1.7cm SS fibroid. Area of increased echogenicity with vascularity within the endometrium measuring 1.1 x 0.6cm.    Assessment: 31yo with AUB and suspected intrauterine polyp presenting for scheduled hysteroscopy/polypectomy/levnorgestrel IUD insertion.   Reviewed intraop plan with the patient. Discussed surgical risks including but not limited to pain, infection, bleeding, damage to surrounding structures including bladder/bowel/nerves; informed consent obtained. No significant changes to med/surg history, preoperative clearance obtained. COVID19 neg, Bhcg neg. Proceed to OR.     Kath Le MD  11/23/2022   12:43 PM  "